# Patient Record
Sex: MALE | Race: WHITE
[De-identification: names, ages, dates, MRNs, and addresses within clinical notes are randomized per-mention and may not be internally consistent; named-entity substitution may affect disease eponyms.]

---

## 2018-11-18 ENCOUNTER — HOSPITAL ENCOUNTER (OUTPATIENT)
Dept: HOSPITAL 80 - FED | Age: 63
Setting detail: OBSERVATION
LOS: 1 days | Discharge: HOME | End: 2018-11-19
Attending: INTERNAL MEDICINE | Admitting: INTERNAL MEDICINE
Payer: COMMERCIAL

## 2018-11-18 DIAGNOSIS — E66.9: ICD-10-CM

## 2018-11-18 DIAGNOSIS — I95.9: ICD-10-CM

## 2018-11-18 DIAGNOSIS — Z82.49: ICD-10-CM

## 2018-11-18 DIAGNOSIS — E78.00: ICD-10-CM

## 2018-11-18 DIAGNOSIS — R07.9: Primary | ICD-10-CM

## 2018-11-18 DIAGNOSIS — I71.2: ICD-10-CM

## 2018-11-18 DIAGNOSIS — E78.5: ICD-10-CM

## 2018-11-18 DIAGNOSIS — G47.33: ICD-10-CM

## 2018-11-18 LAB — PLATELET # BLD: 312 10^3/UL (ref 150–400)

## 2018-11-18 PROCEDURE — 96372 THER/PROPH/DIAG INJ SC/IM: CPT

## 2018-11-18 PROCEDURE — G0378 HOSPITAL OBSERVATION PER HR: HCPCS

## 2018-11-18 PROCEDURE — 71275 CT ANGIOGRAPHY CHEST: CPT

## 2018-11-18 PROCEDURE — 93005 ELECTROCARDIOGRAM TRACING: CPT

## 2018-11-18 PROCEDURE — 99285 EMERGENCY DEPT VISIT HI MDM: CPT

## 2018-11-18 PROCEDURE — 93017 CV STRESS TEST TRACING ONLY: CPT

## 2018-11-18 PROCEDURE — 71046 X-RAY EXAM CHEST 2 VIEWS: CPT

## 2018-11-18 PROCEDURE — 96374 THER/PROPH/DIAG INJ IV PUSH: CPT

## 2018-11-18 NOTE — EDPHY
HPI/HX/ROS/PE/MDM





- Data Points


Imaging: Discussed imaging studies w/ On call Radiologist, I viewed and 

interpreted images myself


Narrative: 


CHIEF COMPLAINT: Chest pain





HPI: 


The patient is a 62 y/o male with a history of an enlarged aorta (3cm), 

atrioseptal defect, and hypotension complaining of chest pain onset Friday, 3 

days ago. Last April he saw Dr. Topete, cardiologist, and was diagnosed with an 

enlarged aorta per an echocardiogram. His last stress test was over one year 

ago. On Friday morning, he developed a dull aching mid sternal chest pain. Over 

the last several days the pain has progressed to the left side of his chest as 

well as radiating back to his left shoulder. Starting 30 minutes ago tonight, 

around 20:50, he was lying on the couch watching TV and started to develop a 

sharp chest pain. This pain changes with position and is worse when taking a 

deep breath. No headache, sore throat, shortness of breath, abdominal pain, 

urinary of bowel complaints, numbness. His father does have significant cardiac 

history including a CABG. 





REVIEW OF SYSTEMS:


Aside from elements discussed in the HPI, a comprehensive 10 system review of 

systems is otherwise negative.





PMH: Depression, ADD, hypotension, hypercholesteremia, enlarged aorta (close to 

3 cm), atrioseptal defect





SOCIAL HISTORY: Friend at bedside, lives in [place], employed as a teacher





PHYSICAL EXAM:


General: Patient is alert, in no acute distress.


ENT: Eyes are normal to inspection.  ENT inspection normal.


Neck: Normal inspection.  Full range of motion.


Respiratory: No respiratory distress.  Breath sounds normal bilaterally.


Cardiovascular: Regular rate and rhythm.  Strong peripheral pulses.  Normal cap 

refill.


Abdomen: The abdomen is nontender to palpation. There are no peritoneal signs. 

There are normal bowel sounds.


Back: Normal to inspection.  No tenderness to palpation.


Skin: Normal color.  No rash.  Warm and dry.


Extremities: Normal appearance.  Full range of motion.


Neuro: Oriented x3.  Normal motor function.  Normal sensory function.


 (Gm Leonard)


ED Course: 





2114: I reviewed patient's EKG which is normal.





2130: Patient is still having chest pain; 0.4mg SL Nitroglycerin administered.





2131: Patient's troponin is negative. Chest CT, chest x-ray, and additional 

laboratory studies still pending.





2155: I reviewed patient's chest x-ray which reveals an enlarged mediastinal 

aorta; radiologist reading still pending.





2200: I reviewed patient's chest CT; radiologist reading still pending.





2231: I spoke with Dr. Dsouza, cardiologist, regarding patient's chest CT. There 

are no acute findings. Patient does have an aortic aneurysm which was 

previously diagnosed via the echo.





2232: Reassessed patient and discussed laboratory and imaging findings. I have 

offered him admission vs. outpatient follow up with a cardiologist.  He would 

like to proceed with plan for second troponin/ECG and discharge home with 

outpatient cardiology follow-up if still negative.  He understands risks of not 

being admitted for observation.  I have signed patient out to Dr. Ibanez 

pending re-evaluation. 


 (Gm Leonard)


MDM: 





2342:  Patient signed over to me at 11:00 p.m. Shift change From Dr. Leonard. 

Patient signed over to me to go home, pending second ekg, and second trop.  

Patient is eager to be discharged home.  He declined hospitalization declined 

to be admitted for chest pain evaluation.





EKG interpretation by me on record in [employer] system.  Impression this is a 

2nd EKG time of 2nd EKG 2340, sinus rhythm rate of 69 with no signs of acute 

ischemia no ST elevation no ST depression no T-wave abnormalities.





0105:  Had a long discussion with the patient about his workup here in the 

emergency room, I have answered all his questions.  On review of his 

cardiovascular risk factors which includes Family history, is dad had a bypass 

surgery, additionally his risk factors of hyperlipidemia, obesity, and the fact 

that he is still having left-sided chest discomfort I did make the 

recommendation that he stays for observation overnight for further 

cardiovascular risk stratification.  And cardiovascular rule out.





The patient has agreed for this.





His cardiovascular risk factors include obesity, age, hyperlipidemia and family 

history.





Patient agrees for admission overnight. (Zeus Ibanez)





- Data Points


Imaging Results: 


 Imaging Impressions





Chest X-Ray  11/18/18 21:13


Impression: Query chronic airways disease with no superimposed acute 

abnormality identified.


 








Chest/Thorax CTA  11/18/18 21:33


Impression:


1. Aneurysmal dilatation of the ascending aorta estimated at 4.5 cm. There is 

no dissection or evidence for aneurysmal leaking. Comparison with prior studies 

if available would be helpful.


2. Negative for pulmonary embolic disease.


3. Rounded area involving the upper pole of the left kidney is incompletely 

evaluated on this study and is probably a simple cyst.


4. See above report for additional findings.


 


Results called and discussed with Gm Leonard MD on 11/18/2018 22:28.


 











Laboratory Results: 


 Laboratory Results





 11/18/18 21:20 





 11/18/18 21:20 





 











  11/18/18 11/18/18 11/18/18





  23:44 21:32 21:21


 


WBC      





    


 


RBC      





    


 


Hgb      





    


 


POC Hgb    16.7 gm/dL gm/dL  





    (13.7-17.5)  


 


Hct      





    


 


POC Hct    49 % %  





    (40-51)  


 


MCV      





    


 


MCH      





    


 


MCHC      





    


 


RDW      





    


 


Plt Count      





    


 


MPV      





    


 


Neut % (Auto)      





    


 


Lymph % (Auto)      





    


 


Mono % (Auto)      





    


 


Eos % (Auto)      





    


 


Baso % (Auto)      





    


 


Nucleat RBC Rel Count      





    


 


Absolute Neuts (auto)      





    


 


Absolute Lymphs (auto)      





    


 


Absolute Monos (auto)      





    


 


Absolute Eos (auto)      





    


 


Absolute Basos (auto)      





    


 


Absolute Nucleated RBC      





    


 


Immature Gran %      





    


 


Seg Neutrophils %      





    


 


Band Neutrophils %      





    


 


Lymphocytes %      





    


 


Monocytes %      





    


 


Eosinophils %      





    


 


Basophils %      





    


 


Metamyelocytes %      





    


 


Myelocytes %      





    


 


Promyelocytes %      





    


 


Blast Cells %      





    


 


Immature Gran #      





    


 


Absolute Seg Neuts      





    


 


Absolute Band Neuts      





    


 


Absolute Lymphocytes      





    


 


Absolute Monocytes      





    


 


Absolute Eosinophils      





    


 


Absolute Basophils      





    


 


Absolute Metamyelocyte      





    


 


Absolute Myelocytes      





    


 


Absolute Promyelocytes      





    


 


Absolute Plasma Cells      





    


 


Nucleated RBCs      





    


 


RBC/WBC/PLT Morphology      





    


 


Absolute Blast Cells      





    


 


Plasma Cells %      





    


 


Platelet Estimate      





    


 


POC Sodium    142 mEq/L mEq/L  





    (135-145)  


 


Sodium      





    


 


POC Potassium    3.8 mEq/L mEq/L  





    (3.3-5.0)  


 


Potassium      





    


 


POC Chloride    106 mEq/L mEq/L  





    ()  


 


Chloride      





    


 


Carbon Dioxide      





    


 


Anion Gap      





    


 


POC BUN    21 mg/dL mg/dL  





    (7-23)  


 


BUN      





    


 


Creatinine      





    


 


POC Creatinine    1.2 mg/dL mg/dL  





    (0.7-1.3)  


 


Estimated GFR      





    


 


Glucose      





    


 


POC Glucose    93 mg/dL mg/dL  





    ()  


 


Calcium      





    


 


POC Troponin I  0.00 ng/mL ng/mL    0.00 ng/mL ng/mL





   (0.00-0.08)    (0.00-0.08) 














  11/18/18 11/18/18





  21:20 21:20


 


WBC    10.78 10^3/uL H 10^3/uL





    (3.80-9.50) 


 


RBC    5.64 10^6/uL 10^6/uL





    (4.40-6.38) 


 


Hgb    15.8 g/dL g/dL





    (13.7-17.5) 


 


POC Hgb    





   


 


Hct    46.6 % %





    (40.0-51.0) 


 


POC Hct    





   


 


MCV    82.6 fL fL





    (81.5-99.8) 


 


MCH    28.0 pg pg





    (27.9-34.1) 


 


MCHC    33.9 g/dL g/dL





    (32.4-36.7) 


 


RDW    14.2 % %





    (11.5-15.2) 


 


Plt Count    312 10^3/uL 10^3/uL





    (150-400) 


 


MPV    10.3 fL fL





    (8.7-11.7) 


 


Neut % (Auto)    Not Reported 





   


 


Lymph % (Auto)    Not Reported 





   


 


Mono % (Auto)    Not Reported 





   


 


Eos % (Auto)    Not Reported 





   


 


Baso % (Auto)    Not Reported 





   


 


Nucleat RBC Rel Count    Not Reported 





   


 


Absolute Neuts (auto)    Not Reported 





   


 


Absolute Lymphs (auto)    Not Reported 





   


 


Absolute Monos (auto)    Not Reported 





   


 


Absolute Eos (auto)    Not Reported 





   


 


Absolute Basos (auto)    Not Reported 





   


 


Absolute Nucleated RBC    Not Reported 





   


 


Immature Gran %    Not Reported 





   


 


Seg Neutrophils %    68.0 % %





   


 


Band Neutrophils %    0.0 % %





   


 


Lymphocytes %    22.0 % %





   


 


Monocytes %    5.0 % %





   


 


Eosinophils %    3.0 % %





   


 


Basophils %    2.0 % %





   


 


Metamyelocytes %    0.0 % %





   


 


Myelocytes %    0.0 % %





   


 


Promyelocytes %    0.0 % %





   


 


Blast Cells %    0.0 % %





   


 


Immature Gran #    Not Reported 





   


 


Absolute Seg Neuts    7.33 10^3/uL H 10^3/uL





    (1.70-6.50) 


 


Absolute Band Neuts    0.00 10^3/uL 10^3/uL





    (0.00-0.70) 


 


Absolute Lymphocytes    2.37 10^3/uL 10^3/uL





    (1.00-3.00) 


 


Absolute Monocytes    0.54 10^3/uL 10^3/uL





    (0.30-0.80) 


 


Absolute Eosinophils    0.32 10^3/uL 10^3/uL





    (0.03-0.40) 


 


Absolute Basophils    0.22 10^3/uL H 10^3/uL





    (0.02-0.10) 


 


Absolute Metamyelocyte    0.00 10^3/mL 10^3/mL





    (0.00-0.00) 


 


Absolute Myelocytes    0.00 10^3/mL 10^3/mL





    (0.00-0.00) 


 


Absolute Promyelocytes    0.00 10^3/uL 10^3/uL





    (0.00-0.00) 


 


Absolute Plasma Cells    0.00 10^3/uL 10^3/uL





    (0.00-0.00) 


 


Nucleated RBCs    0 /100 WBC /100 WBC





    (0-0) 


 


RBC/WBC/PLT Morphology    NORMAL 





    (NORMAL) 


 


Absolute Blast Cells    0.00 10^3/uL 10^3/uL





    (0.00-0.00) 


 


Plasma Cells %    0.0 % %





   


 


Platelet Estimate    ADEQUATE 





    (ADEQ) 


 


POC Sodium    





   


 


Sodium  140 mEq/L mEq/L  





   (135-145)  


 


POC Potassium    





   


 


Potassium  4.3 mEq/L mEq/L  





   (3.3-5.0)  


 


POC Chloride    





   


 


Chloride  106 mEq/L mEq/L  





   ()  


 


Carbon Dioxide  27 mEq/l mEq/l  





   (22-31)  


 


Anion Gap  7 mEq/L mEq/L  





   (6-14)  


 


POC BUN    





   


 


BUN  20 mg/dL mg/dL  





   (7-23)  


 


Creatinine  1.2 mg/dL mg/dL  





   (0.7-1.3)  


 


POC Creatinine    





   


 


Estimated GFR  > 60   





   


 


Glucose  95 mg/dL mg/dL  





   ()  


 


POC Glucose    





   


 


Calcium  9.9 mg/dL mg/dL  





   (8.5-10.4)  


 


POC Troponin I    





   











Medications Given: 


 








Discontinued Medications





Ketorolac Tromethamine (Toradol)  30 mg IVP EDNOW ONE


   Stop: 11/18/18 23:17


   Last Admin: 11/18/18 23:18 Dose:  30 mg


Nitroglycerin (Nitrostat)  0.4 mg SL EDNOW ONE


   Stop: 11/18/18 21:23


   Last Admin: 11/18/18 21:25 Dose:  0.4 mg





Point of Care Test Results: 


 Chemistry











  11/18/18 11/18/18 11/18/18





  23:44 21:32 21:21


 


POC Sodium    142 mEq/L mEq/L  





    (135-145)  


 


POC Potassium    3.8 mEq/L mEq/L  





    (3.3-5.0)  


 


POC Chloride    106 mEq/L mEq/L  





    ()  


 


POC BUN    21 mg/dL mg/dL  





    (7-23)  


 


POC Creatinine    1.2 mg/dL mg/dL  





    (0.7-1.3)  


 


POC Glucose    93 mg/dL mg/dL  





    ()  


 


POC Troponin I  0.00 ng/mL ng/mL    0.00 ng/mL ng/mL





   (0.00-0.08)    (0.00-0.08) 








 ISTAT H&H











  11/18/18





  21:32


 


POC Hgb  16.7 gm/dL gm/dL





   (13.7-17.5) 


 


POC Hct  49 % %





   (40-51) 














General


Time Seen by Provider: 11/18/18 21:12


Initial Vital Signs: 


 Initial Vital Signs











Temperature (C)  36.7 C   11/18/18 21:04


 


Heart Rate  87   11/18/18 21:04


 


Respiratory Rate  18   11/18/18 21:04


 


Blood Pressure  155/92 H  11/18/18 21:04


 


O2 Sat (%)  95   11/18/18 21:04








 











O2 Delivery Mode               Room Air














Allergies/Adverse Reactions: 


 





Penicillins Allergy (Verified 11/19/18 10:01)


 Rash








Home Medications: 














 Medication  Instructions  Recorded


 


Amphet Asp and D/Amphet [Adderall 10 mg PO DAILY14 PRN 11/18/18





10 MG (*)]  


 


Bupropion HCl [Wellbutrin Xl] 450 mg PO DAILY 11/18/18


 


Desvenlafaxine [Desvenlafaxine ER] 50 mg PO DAILY 11/18/18


 


Mirtazapine [Remeron] 60 mg PO HS 11/18/18


 


Niacin ER [Niaspan 1000 mg (*)] 1,000 mg PO HS 11/18/18


 


Pravastatin Sodium 20 mg PO HS 11/18/18


 


Sodium Bicarbonate [Na Bicarb] 650 mg PO DAILY 11/18/18


 


Aspirin [Aspirin 325 mg (*)] 325 mg PO HS 11/19/18


 


Dextroamphetamine/Amphetamine 30 mg PO DAILY 11/19/18





[Adderall Xr 30 mg Capsule]  


 


Fluticasone Nasal [Flonase Nasal 1 sprays NASAL HS 11/19/18





Spray]  


 


Multivitamins [Multivitamin (*)] 1 each PO DAILY 11/19/18














Departure





- Departure


Disposition: [facility]


Clinical Impression: 


Chest pain


Qualifiers:


 Chest pain type: chest pain on breathing Qualified Code(s): R07.1 - Chest pain 

on breathing





Condition: Good


Report Scribed for: Gm M Whitling


Report Scribed by: Ana Singh


Date of Report: 11/18/18


Time of Report: 21:14


Physician Review and Approval Statement: Portions of this note were transcribed 

by an ED scribe.  I personally performed the history, physical exam, and 

medical decision making; and confirm the accuracy of the information in the 

transcribed note.

## 2018-11-19 VITALS — DIASTOLIC BLOOD PRESSURE: 70 MMHG | SYSTOLIC BLOOD PRESSURE: 124 MMHG

## 2018-11-19 LAB — PLATELET # BLD: 271 10^3/UL (ref 150–400)

## 2018-11-19 NOTE — ASMTLACE
LACE

 

Length of stay for            Answers:  1 day                                 

current admission                                                             

Acuity / Level of             Answers:  No                                    

Care: Did the patient                                                         

have an inpatient                                                             

admission?                                                                    

Comorbidities - select        Answers:  Other                         Notes:  HLD

all that apply                                                                

# of Emergency department     Answers:  1-2                                   

visits in the last 6                                                          

months                                                                        

Social determinants           Answers:  Mental health diagnosis               

                                        (anxiety, depression, pers            

                                        onality disorders, etc.)              

Score: 6

 

Date Signed:  11/19/2018 02:45 PM

Electronically Signed By:Bekah Mcdonough RN

## 2018-11-19 NOTE — PDGENHP
History and Physical





- Chief Complaint


Chest pain





- History of Present Illness


62 yo obese M w/ MARIANA and HLD presents with chest pain. The patient reports 

intermittent left-sided chest pain since Friday. This became more severe today 

so he presented to the ED. He describes a sharp, left-sided chest pain that now 

radiates to his left shoulder. This was moderate earlier but has improved to 

mild after Toradol. He noted no effect with nitroglycerin. He also notes a 

positional component to the pain and denies association with exertion or rest. 

In the ED his evaluation has been thus far unremarkable. He is being admitted 

for observation and further work-up.





Case discussed with ED physician Dr. Pfeiffer; records reviewed and summarized 

above.





History Information





- Allergies/Home Medication List


Allergies/Adverse Reactions: 








Penicillins Allergy (Verified 11/18/18 21:08)


 


unk lipid med Allergy (Uncoded 11/18/18 21:08)


 





Home Medications: 








Adderall 10 MG (*)  11/18/18 [Last Taken Unknown]


Desvenlafaxine  11/18/18 [Last Taken Unknown]


MIRTAZAPINE  11/18/18 [Last Taken Unknown]


Niacin  11/18/18 [Last Taken Unknown]


Pravastatin Sodium  11/18/18 [Last Taken Unknown]


Sodium Pills  11/18/18 [Last Taken Unknown]


Wellbutrin Sr  11/18/18 [Last Taken Unknown]





I have personally reviewed and updated: family history, medical history





- Past Medical History


hyperlipidemia


Additional medical history: MARIANA.  Obesity





- Surgical History


Additional surgical history: Bilateral knee (meniscus) surgeries





- Family History


Positive for: CAD





- Social History


Smoking Status: Never smoked





Review of Systems


Review of Systems: 





ROS: 10pt was reviewed & negative except for what was stated in HPI & below





Physical Exam


Physical Exam: 

















Temp Pulse Resp BP Pulse Ox


 


 36.7 C   76   16   103/59 L  93 


 


 11/18/18 21:04  11/19/18 00:53  11/19/18 00:53  11/19/18 00:53  11/19/18 00:53











Constitutional: no apparent distress, obese


Eyes: PERRL, EOMI


Ears, Nose, Mouth, Throat: moist mucous membranes, no oral mucosal ulcers


Cardiovascular: regular rate and rhythym, no murmur, rub, or gallop


Respiratory: no respiratory distress, clear to auscultation


Gastrointestinal: normoactive bowel sounds, soft, non-tender abdomen


Skin: warm, normal color


Musculoskeletal: full muscle strength, no muscle tenderness


Neurologic: AAOx3, CN II-XII Intact


Psychiatric: interacting appropriately, not anxious





Lab Data & Imaging Review





 11/18/18 21:20





 11/18/18 21:20














WBC  10.78 10^3/uL (3.80-9.50)  H  11/18/18  21:20    


 


RBC  5.64 10^6/uL (4.40-6.38)   11/18/18  21:20    


 


Hgb  15.8 g/dL (13.7-17.5)   11/18/18  21:20    


 


POC Hgb  16.7 gm/dL (13.7-17.5)   11/18/18  21:32    


 


Hct  46.6 % (40.0-51.0)   11/18/18  21:20    


 


POC Hct  49 % (40-51)   11/18/18  21:32    


 


MCV  82.6 fL (81.5-99.8)   11/18/18  21:20    


 


MCH  28.0 pg (27.9-34.1)   11/18/18  21:20    


 


MCHC  33.9 g/dL (32.4-36.7)   11/18/18  21:20    


 


RDW  14.2 % (11.5-15.2)   11/18/18  21:20    


 


Plt Count  312 10^3/uL (150-400)   11/18/18  21:20    


 


MPV  10.3 fL (8.7-11.7)   11/18/18  21:20    


 


Neut % (Auto)  Not Reported   11/18/18  21:20    


 


Lymph % (Auto)  Not Reported   11/18/18  21:20    


 


Mono % (Auto)  Not Reported   11/18/18  21:20    


 


Eos % (Auto)  Not Reported   11/18/18  21:20    


 


Baso % (Auto)  Not Reported   11/18/18  21:20    


 


Nucleat RBC Rel Count  Not Reported   11/18/18  21:20    


 


Absolute Neuts (auto)  Not Reported   11/18/18  21:20    


 


Absolute Lymphs (auto)  Not Reported   11/18/18  21:20    


 


Absolute Monos (auto)  Not Reported   11/18/18  21:20    


 


Absolute Eos (auto)  Not Reported   11/18/18  21:20    


 


Absolute Basos (auto)  Not Reported   11/18/18  21:20    


 


Absolute Nucleated RBC  Not Reported   11/18/18  21:20    


 


Immature Gran %  Not Reported   11/18/18  21:20    


 


Seg Neutrophils %  68.0 %  11/18/18  21:20    


 


Band Neutrophils %  0.0 %  11/18/18  21:20    


 


Lymphocytes %  22.0 %  11/18/18  21:20    


 


Monocytes %  5.0 %  11/18/18  21:20    


 


Eosinophils %  3.0 %  11/18/18  21:20    


 


Basophils %  2.0 %  11/18/18  21:20    


 


Metamyelocytes %  0.0 %  11/18/18  21:20    


 


Myelocytes %  0.0 %  11/18/18  21:20    


 


Promyelocytes %  0.0 %  11/18/18  21:20    


 


Blast Cells %  0.0 %  11/18/18  21:20    


 


Immature Gran #  Not Reported   11/18/18  21:20    


 


Absolute Seg Neuts  7.33 10^3/uL (1.70-6.50)  H  11/18/18  21:20    


 


Absolute Band Neuts  0.00 10^3/uL (0.00-0.70)   11/18/18  21:20    


 


Absolute Lymphocytes  2.37 10^3/uL (1.00-3.00)   11/18/18  21:20    


 


Absolute Monocytes  0.54 10^3/uL (0.30-0.80)   11/18/18  21:20    


 


Absolute Eosinophils  0.32 10^3/uL (0.03-0.40)   11/18/18  21:20    


 


Absolute Basophils  0.22 10^3/uL (0.02-0.10)  H  11/18/18  21:20    


 


Absolute Metamyelocyte  0.00 10^3/mL (0.00-0.00)   11/18/18  21:20    


 


Absolute Myelocytes  0.00 10^3/mL (0.00-0.00)   11/18/18  21:20    


 


Absolute Promyelocytes  0.00 10^3/uL (0.00-0.00)   11/18/18  21:20    


 


Absolute Plasma Cells  0.00 10^3/uL (0.00-0.00)   11/18/18  21:20    


 


Nucleated RBCs  0 /100 WBC (0-0)   11/18/18  21:20    


 


RBC/WBC/PLT Morphology  NORMAL  (NORMAL)   11/18/18  21:20    


 


Absolute Blast Cells  0.00 10^3/uL (0.00-0.00)   11/18/18  21:20    


 


Plasma Cells %  0.0 %  11/18/18  21:20    


 


Platelet Estimate  ADEQUATE  (ADEQ)   11/18/18  21:20    


 


POC Sodium  142 mEq/L (135-145)   11/18/18  21:32    


 


Sodium  140 mEq/L (135-145)   11/18/18  21:20    


 


POC Potassium  3.8 mEq/L (3.3-5.0)   11/18/18  21:32    


 


Potassium  4.3 mEq/L (3.3-5.0)   11/18/18  21:20    


 


POC Chloride  106 mEq/L ()   11/18/18  21:32    


 


Chloride  106 mEq/L ()   11/18/18  21:20    


 


Carbon Dioxide  27 mEq/l (22-31)   11/18/18  21:20    


 


Anion Gap  7 mEq/L (6-14)   11/18/18  21:20    


 


POC BUN  21 mg/dL (7-23)   11/18/18  21:32    


 


BUN  20 mg/dL (7-23)   11/18/18  21:20    


 


Creatinine  1.2 mg/dL (0.7-1.3)   11/18/18  21:20    


 


POC Creatinine  1.2 mg/dL (0.7-1.3)   11/18/18  21:32    


 


Estimated GFR  > 60   11/18/18  21:20    


 


Glucose  95 mg/dL ()   11/18/18  21:20    


 


POC Glucose  93 mg/dL ()   11/18/18  21:32    


 


Calcium  9.9 mg/dL (8.5-10.4)   11/18/18  21:20    


 


POC Troponin I  0.00 ng/mL (0.00-0.08)   11/18/18  23:44    








Imaging Review: 





 Imaging Impressions





Chest X-Ray  11/18/18 21:13


Impression: Query chronic airways disease with no superimposed acute 

abnormality identified.


 








Chest/Thorax CTA  11/18/18 21:33


Impression:


1. Aneurysmal dilatation of the ascending aorta estimated at 4.5 cm. There is 

no dissection or evidence for aneurysmal leaking. Comparison with prior studies 

if available would be helpful.


2. Negative for pulmonary embolic disease.


3. Rounded area involving the upper pole of the left kidney is incompletely 

evaluated on this study and is probably a simple cyst.


4. See above report for additional findings.


 


Results called and discussed with Gm Leonard MD on 11/18/2018 22:28.


 











Visualized and Interpreted EKG results: Yes


EKG Interpretation: Positive for: normal sinsus rhythm





Assessment & Plan


Assessment: 








62 yo obese M w/ HLD and MARIANA presents with chest pain.








Plan: 


1. Chest pain - Atypical in that pain is exacerbated by positional changes and 

not associated with exertion. However, noting obesity, HLD, family history, and 

HEART score of 4, reasonable to admit for further work-up.


- Observe in PCU


- S/p  mg x1


- Monitor on telemetry, trend cardiac enzymes


- Per chest pain protocol, will order standard exercise stress test


- NTG PRN for chest pain


2. Aneurysmal dilatation of the ascending aorta - Estimated at 4.5 cm on 

admission CTA. This is a known issue for the patient. His last TTE per Dr. Topete's note revealed 4.2 cm dilatation in 2016.


3. HLD - Continue statin pending reconciliation of home meds


4. MARIANA


5. Obesity





Diet - NPO


Code - Full


Ppx - LMWH


Dispo - Admit under observation status

## 2018-11-19 NOTE — PDDCSUM
Discharge Summary


Discharge Summary: 





Date of Admission: 11/18/2018


Date of Discharge: 11/19/2018





Studies:


1. CTA chest


2. Exercise stress test





Discharge Diagnoses:


1. Chest pain, non-cardiac (likely costochondritis)


2. Ascending aortic aneurysm


3. Hyperlipidemia


4. MARIANA


5. Obesity





Brief Hospital Course:


64 yo obese M w/ HLD and MARIANA presented with chest pain. Serial ecg/troponins 

were negative. An exercise stress test was without ischemic ECG changes or 

precipitation of symptoms. His telemetry was quiet. Given his past history of 

aortic aneurysm, a CTA of this chest was obtained which showed ascending aortic 

dilation of 4.5cm, last noted to be 4.2cm on TTE from 2016. His chest pain did 

respond very well to anti-inflammatories and he did have some chest wall pain 

making costochondritis a likely etiology of his symptoms. He was encouraged to 

trial ibuprofen for 1-2 weeks for this. 





Medications: Please refer to EMR for complete list. No changes were made this 

admission.





Follow Up Plan:


1. Re-establish with cardiology (Dr Topete) for monitoring of ascending aorta 

aneurysm





Physical Exam: Vitals and telemetry reviewed, stable. Alert and oriented. RRR 

without m/r/g, lungs clear, abdomen soft, no leg edema or JVD. Mild chest wall 

tenderness.

## 2018-11-19 NOTE — CPR
DATE OF PROCEDURE:  11/19/2018



PROCEDURE:  Treadmill stress test.



REASON FOR TEST:  

1.  Low-level constant chest discomfort.

2.  Family history of coronary artery disease.

3.  Known dilated aorta.  





Resting EKG shows a regular sinus rhythm.  Heart rate 76.  Resting blood pressure 124/84.  He reports
 a continuous chest discomfort of 2/10 on the pain scale.  He has no shortness of breath or dizziness
.



EXERCISE PORTION:  He was exercised according to the [facility] protocol.  His chest discomfort remained t
he same.  There were no significant EKG changes.  Peak heart rate 137, peak blood pressure 200/94.  M
ET level 7.7.  He exercised for a total of 6 minutes and 47 seconds reaching his predicted 85% heart 
rate.



RECOVERY:  He did spontaneously recover.  Resting blood pressure 136/84.  Resting heart rate 98.  The
re were no significant EKG changes.  He did have a rare PVC. 



At this time, he currently is stable.





Job #:  [id number]

## 2018-11-20 NOTE — CPEKG
Test Reason : OPEN

Blood Pressure : ***/*** mmHG

Vent. Rate : 083 BPM     Atrial Rate : 082 BPM

   P-R Int : 167 ms          QRS Dur : 101 ms

    QT Int : 368 ms       P-R-T Axes : 057 006 046 degrees

   QTc Int : 433 ms

 

Sinus rhythm

 

Confirmed by Gm Leonard ([id number]) on 11/20/2018 3:11:53 PM

 

Referred By:             Confirmed By:Gm Leonard

## 2018-11-21 NOTE — CPEKG
Test Reason : OPEN

Blood Pressure : ***/*** mmHG

Vent. Rate : 069 BPM     Atrial Rate : 069 BPM

   P-R Int : 177 ms          QRS Dur : 095 ms

    QT Int : 385 ms       P-R-T Axes : 046 002 031 degrees

   QTc Int : 413 ms

 

Sinus rhythm

 

Confirmed by Zeus Ibanez ([id number]) on 11/21/2018 7:05:15 AM

 

Referred By:             Confirmed By:Zeus Ibanez